# Patient Record
Sex: MALE | Race: WHITE | NOT HISPANIC OR LATINO | Employment: FULL TIME | URBAN - METROPOLITAN AREA
[De-identification: names, ages, dates, MRNs, and addresses within clinical notes are randomized per-mention and may not be internally consistent; named-entity substitution may affect disease eponyms.]

---

## 2019-03-20 ENCOUNTER — HOSPITAL ENCOUNTER (OUTPATIENT)
Dept: FAMILY MEDICINE CLINIC | Facility: CLINIC | Age: 54
Setting detail: SPECIMEN
Discharge: HOME | End: 2019-03-20
Attending: FAMILY MEDICINE | Admitting: FAMILY MEDICINE

## 2019-03-20 LAB
ALBUMIN SERPL-MCNC: 4.1 G/DL (ref 3.5–4.8)
ALBUMIN/GLOB SERPL: 1.3 {RATIO} (ref 1–1.7)
ALP SERPL-CCNC: 52 IU/L (ref 32–91)
ALT SERPL-CCNC: 25 IU/L (ref 17–63)
ANION GAP SERPL CALC-SCNC: 18.2 MMOL/L (ref 10–20)
AST SERPL-CCNC: 34 IU/L (ref 15–41)
BASOPHILS # BLD AUTO: 0 10*3/UL (ref 0–0.2)
BASOPHILS NFR BLD AUTO: 0 % (ref 0–2)
BILIRUB SERPL-MCNC: 0.9 MG/DL (ref 0.3–1.2)
BILIRUB UR QL STRIP: NEGATIVE MG/DL
BUN SERPL-MCNC: 15 MG/DL (ref 8–20)
BUN/CREAT SERPL: 12.5 (ref 6.2–20.3)
CALCIUM SERPL-MCNC: 9.2 MG/DL (ref 8.9–10.3)
CASTS URNS QL MICRO: NORMAL /[LPF]
CHLORIDE SERPL-SCNC: 99 MMOL/L (ref 101–111)
CHOLEST SERPL-MCNC: 237 MG/DL
CHOLEST/HDLC SERPL: 5 {RATIO}
COLOR UR: YELLOW
CONV BACTERIA IN URINE MICRO: NEGATIVE
CONV CLARITY OF URINE: CLEAR
CONV CO2: 24 MMOL/L (ref 22–32)
CONV HYALINE CASTS IN URINE MICRO: 0 /[LPF] (ref 0–5)
CONV LDL CHOLESTEROL DIRECT: 186 MG/DL (ref 0–100)
CONV PROTEIN IN URINE BY AUTOMATED TEST STRIP: NEGATIVE MG/DL
CONV SMALL ROUND CELLS: NORMAL /[HPF]
CONV TOTAL PROTEIN: 7.2 G/DL (ref 6.1–7.9)
CONV UROBILINOGEN IN URINE BY AUTOMATED TEST STRIP: 0.2 MG/DL
CREAT UR-MCNC: 1.2 MG/DL (ref 0.7–1.2)
CULTURE INDICATED?: NORMAL
DIFFERENTIAL METHOD BLD: (no result)
EOSINOPHIL # BLD AUTO: 0.2 10*3/UL (ref 0–0.3)
EOSINOPHIL # BLD AUTO: 4 % (ref 0–3)
ERYTHROCYTE [DISTWIDTH] IN BLOOD BY AUTOMATED COUNT: 13.3 % (ref 11.5–14.5)
GLOBULIN UR ELPH-MCNC: 3.1 G/DL (ref 2.5–3.8)
GLUCOSE SERPL-MCNC: 101 MG/DL (ref 65–99)
GLUCOSE UR QL: NEGATIVE MG/DL
HCT VFR BLD AUTO: 41.2 % (ref 40–54)
HDLC SERPL-MCNC: 47 MG/DL
HGB BLD-MCNC: 13.9 G/DL (ref 14–18)
HGB UR QL STRIP: NEGATIVE
KETONES UR QL STRIP: NEGATIVE MG/DL
LDLC/HDLC SERPL: 4 {RATIO}
LEUKOCYTE ESTERASE UR QL STRIP: NEGATIVE
LIPID INTERPRETATION: ABNORMAL
LYMPHOCYTES # BLD AUTO: 1.6 10*3/UL (ref 0.8–4.8)
LYMPHOCYTES NFR BLD AUTO: 39 % (ref 18–42)
MCH RBC QN AUTO: 32.1 PG (ref 26–32)
MCHC RBC AUTO-ENTMCNC: 33.7 G/DL (ref 32–36)
MCV RBC AUTO: 95.4 FL (ref 80–94)
MONOCYTES # BLD AUTO: 0.4 10*3/UL (ref 0.1–1.3)
MONOCYTES NFR BLD AUTO: 11 % (ref 2–11)
NEUTROPHILS # BLD AUTO: 1.8 10*3/UL (ref 2.3–8.6)
NEUTROPHILS NFR BLD AUTO: 46 % (ref 50–75)
NITRITE UR QL STRIP: NEGATIVE
NRBC BLD AUTO-RTO: 0 /100{WBCS}
NRBC/RBC NFR BLD MANUAL: 0 10*3/UL
PH UR STRIP.AUTO: 6 [PH] (ref 4.5–8)
PLATELET # BLD AUTO: 241 10*3/UL (ref 150–450)
PMV BLD AUTO: 8.1 FL (ref 7.4–10.4)
POTASSIUM SERPL-SCNC: 4.2 MMOL/L (ref 3.6–5.1)
RBC # BLD AUTO: 4.32 10*6/UL (ref 4.6–6)
RBC #/AREA URNS HPF: 0 /[HPF] (ref 0–3)
SODIUM SERPL-SCNC: 137 MMOL/L (ref 136–144)
SP GR UR: 1.02 (ref 1–1.03)
SPERM URNS QL MICRO: NORMAL /[HPF]
SQUAMOUS SPT QL MICRO: 0 /[HPF] (ref 0–5)
TRIGL SERPL-MCNC: 64 MG/DL
UNIDENT CRYS URNS QL MICRO: NORMAL /[HPF]
VLDLC SERPL CALC-MCNC: 3.8 MG/DL
WBC # BLD AUTO: 4 10*3/UL (ref 4.5–11.5)
WBC #/AREA URNS HPF: 0 /[HPF] (ref 0–5)
YEAST SPEC QL WET PREP: NORMAL /[HPF]

## 2019-06-17 RX ORDER — ACYCLOVIR 50 MG/G
OINTMENT TOPICAL
Qty: 15 G | Refills: 0 | Status: SHIPPED | OUTPATIENT
Start: 2019-06-17

## 2019-08-06 ENCOUNTER — OFFICE VISIT (OUTPATIENT)
Dept: FAMILY MEDICINE CLINIC | Facility: CLINIC | Age: 54
End: 2019-08-06

## 2019-08-06 VITALS
HEIGHT: 70 IN | DIASTOLIC BLOOD PRESSURE: 76 MMHG | HEART RATE: 47 BPM | TEMPERATURE: 97.8 F | BODY MASS INDEX: 29.72 KG/M2 | WEIGHT: 207.6 LBS | RESPIRATION RATE: 16 BRPM | OXYGEN SATURATION: 98 % | SYSTOLIC BLOOD PRESSURE: 114 MMHG

## 2019-08-06 DIAGNOSIS — B02.9 HERPES ZOSTER WITHOUT COMPLICATION: Primary | ICD-10-CM

## 2019-08-06 PROCEDURE — 99213 OFFICE O/P EST LOW 20 MIN: CPT | Performed by: NURSE PRACTITIONER

## 2019-08-06 RX ORDER — METHYLPREDNISOLONE 4 MG/1
TABLET ORAL
Qty: 21 TABLET | Refills: 0 | Status: SHIPPED | OUTPATIENT
Start: 2019-08-06 | End: 2020-01-29

## 2019-08-06 RX ORDER — VALACYCLOVIR HYDROCHLORIDE 1 G/1
1000 TABLET, FILM COATED ORAL 3 TIMES DAILY
Qty: 21 TABLET | Refills: 0 | Status: SHIPPED | OUTPATIENT
Start: 2019-08-06 | End: 2019-08-13

## 2019-08-06 RX ORDER — ACYCLOVIR 50 MG/G
CREAM TOPICAL
COMMUNITY
Start: 2019-06-04 | End: 2020-01-29 | Stop reason: SDUPTHER

## 2019-08-06 NOTE — PROGRESS NOTES
"Subjective   Brett Brush is a 53 y.o. male.     Chief Complaint   Patient presents with   • rash on head   • Herpes Zoster       /76 (BP Location: Left arm, Patient Position: Sitting, Cuff Size: Large Adult)   Pulse (!) 47   Temp 97.8 °F (36.6 °C) (Oral)   Resp 16   Ht 177.8 cm (70\")   Wt 94.2 kg (207 lb 9.6 oz)   SpO2 98%   BMI 29.79 kg/m²     BP Readings from Last 3 Encounters:   08/06/19 114/76   03/27/19 110/70   10/08/18 122/80       Wt Readings from Last 3 Encounters:   08/06/19 94.2 kg (207 lb 9.6 oz)   03/27/19 93 kg (204 lb 16 oz)   10/08/18 95.3 kg (209 lb 16 oz)       Pt comes in today with c/o poss shingles rash. States he noticed over the weekend had some head pain, tingling in head. Then developed an outbreak on back of scalp and has since developed new areas on right shoulder, neck, and cheek.  Has tingling, pain, and shooting pains in head.  Hx of chicken pox as a child.           The following portions of the patient's history were reviewed and updated as appropriate: allergies, current medications, past family history, past medical history, past social history, past surgical history and problem list.    Review of Systems   Skin: Positive for rash and skin lesions.       Objective   Physical Exam   Constitutional: He is oriented to person, place, and time. He appears well-developed and well-nourished.   Eyes: Pupils are equal, round, and reactive to light.   Cardiovascular: Normal rate and regular rhythm.   Pulmonary/Chest: Effort normal and breath sounds normal.   Neurological: He is alert and oriented to person, place, and time.   Skin:   Red, shingles rash on back of scalp on right side, neck, and right shoulder area. No itching.          Assessment/Plan   Brett was seen today for rash on head and herpes zoster.    Diagnoses and all orders for this visit:    Herpes zoster without complication  -     methylPREDNISolone (MEDROL, NANDA,) 4 MG tablet; Take as directed on package " instructions.  -     valACYclovir (VALTREX) 1000 MG tablet; Take 1 tablet by mouth 3 (Three) Times a Day for 7 days.    During this office visit, we discussed the pertinent aspects of the visit and treatment recommendations. Pt verbalizes understanding. Follow up was discussed. Patient was given the opportunity to ask questions and discuss other concerns.

## 2019-08-06 NOTE — PATIENT INSTRUCTIONS
Shingles    Shingles is an infection. It gives you a painful skin rash and blisters that have fluid in them. Shingles is caused by the same germ (virus) that causes chickenpox.  Shingles only happens in people who:  · Have had chickenpox.  · Have been given a shot of medicine (vaccine) to protect against chickenpox. Shingles is rare in this group.  The first symptoms of shingles may be itching, tingling, or pain in an area on your skin. A rash will show on your skin a few days or weeks later. The rash is likely to be on one side of your body. The rash usually has a shape like a belt or a band. Over time, the rash turns into fluid-filled blisters. The blisters will break open, change into scabs, and dry up. Medicines may:  · Help with pain and itching.  · Help you get better sooner.  · Help to prevent long-term problems.  Follow these instructions at home:  Medicines  · Take over-the-counter and prescription medicines only as told by your doctor.  · Put on an anti-itch cream or numbing cream where you have a rash, blisters, or scabs. Do this as told by your doctor.  Helping with itching and discomfort    · Put cold, wet cloths (cold compresses) on the area of the rash or blisters as told by your doctor.  · Cool baths can help you feel better. Try adding baking soda or dry oatmeal to the water to lessen itching. Do not bathe in hot water.  Blister and rash care  · Keep your rash covered with a loose bandage (dressing).  · Wear loose clothing that does not rub on your rash.  · Keep your rash and blisters clean. To do this, wash the area with mild soap and cool water as told by your doctor.  · Check your rash every day for signs of infection. Check for:  ? More redness, swelling, or pain.  ? Fluid or blood.  ? Warmth.  ? Pus or a bad smell.  · Do not scratch your rash. Do not pick at your blisters. To help you to not scratch:  ? Keep your fingernails clean and cut short.  ? Wear gloves or mittens when you sleep, if  scratching is a problem.  General instructions  · Rest as told by your doctor.  · Keep all follow-up visits as told by your doctor. This is important.  · Wash your hands often with soap and water. If soap and water are not available, use hand . Doing this lowers your chance of getting a skin infection caused by germs (bacteria).  · Your infection can cause chickenpox in people who have never had chickenpox or never got a shot of chickenpox vaccine. If you have blisters that did not change into scabs yet, try not to touch other people or be around other people, especially:  ? Babies.  ? Pregnant women.  ? Children who have areas of red, itchy, or rough skin (eczema).  ? Very old people who have transplants.  ? People who have a long-term (chronic) sickness, like cancer or AIDS.  Contact a doctor if:  · Your pain does not get better with medicine.  · Your pain does not get better after the rash heals.  · You have any signs of infection in the rash area. These signs include:  ? More redness, swelling, or pain around the rash.  ? Fluid or blood coming from the rash.  ? The rash area feeling warm to the touch.  ? Pus or a bad smell coming from the rash.  Get help right away if:  · The rash is on your face or nose.  · You have pain in your face or pain by your eye.  · You lose feeling on one side of your face.  · You have trouble seeing.  · You have ear pain, or you have ringing in your ear.  · You have a loss of taste.  · Your condition gets worse.  Summary  · Shingles gives you a painful skin rash and blisters that have fluid in them.  · Shingles is an infection. It is caused by the same germ (virus) that causes chickenpox.  · Keep your rash covered with a loose bandage (dressing). Wear loose clothing that does not rub on your rash.  · If you have blisters that did not change into scabs yet, try not to touch other people or be around people.  This information is not intended to replace advice given to you by  your health care provider. Make sure you discuss any questions you have with your health care provider.  Document Released: 06/05/2009 Document Revised: 08/22/2018 Document Reviewed: 08/22/2018  Elsevier Interactive Patient Education © 2019 [employer]

## 2020-01-27 ENCOUNTER — TELEPHONE (OUTPATIENT)
Dept: FAMILY MEDICINE CLINIC | Facility: CLINIC | Age: 55
End: 2020-01-27

## 2020-01-27 NOTE — TELEPHONE ENCOUNTER
VM MESSAGE.  Pt is requesting appointment ASAP for excruciating back pain. When do you want him scheduled? Thank you.

## 2020-01-29 ENCOUNTER — OFFICE VISIT (OUTPATIENT)
Dept: FAMILY MEDICINE CLINIC | Facility: CLINIC | Age: 55
End: 2020-01-29

## 2020-01-29 ENCOUNTER — LAB (OUTPATIENT)
Dept: FAMILY MEDICINE CLINIC | Facility: CLINIC | Age: 55
End: 2020-01-29

## 2020-01-29 VITALS
BODY MASS INDEX: 29.92 KG/M2 | HEIGHT: 70 IN | RESPIRATION RATE: 16 BRPM | DIASTOLIC BLOOD PRESSURE: 80 MMHG | OXYGEN SATURATION: 98 % | WEIGHT: 209 LBS | HEART RATE: 55 BPM | TEMPERATURE: 98.3 F | SYSTOLIC BLOOD PRESSURE: 118 MMHG

## 2020-01-29 DIAGNOSIS — M54.50 ACUTE BILATERAL LOW BACK PAIN WITHOUT SCIATICA: Primary | ICD-10-CM

## 2020-01-29 DIAGNOSIS — E78.2 MIXED HYPERLIPIDEMIA: ICD-10-CM

## 2020-01-29 LAB
CHOLEST SERPL-MCNC: 215 MG/DL (ref 0–200)
HDLC SERPL-MCNC: 47 MG/DL (ref 40–60)
LDLC SERPL CALC-MCNC: 145 MG/DL (ref 0–100)
LDLC/HDLC SERPL: 3.08 {RATIO}
TRIGL SERPL-MCNC: 117 MG/DL (ref 0–150)
VLDLC SERPL-MCNC: 23.4 MG/DL (ref 5–40)

## 2020-01-29 PROCEDURE — 99213 OFFICE O/P EST LOW 20 MIN: CPT | Performed by: FAMILY MEDICINE

## 2020-01-29 PROCEDURE — 80061 LIPID PANEL: CPT | Performed by: FAMILY MEDICINE

## 2020-01-29 NOTE — ASSESSMENT & PLAN NOTE
Patient has and injured his lower back.  He has basically muscle spasms in his extensor muscles probably the latissimus dorsi and the deeper muscles of his back.  They mainly go into spasm when he tries to extend his back after sitting for a while.  There is no radicular pattern into his butt or legs.  No sciatica the pain actually wraps around to the front more bilaterally like his core muscles.  That would make sense since he is not really done anything to injure this except overuse his muscles with yoga and some other activities that he does.  Right now he is going to just be very careful apply heat stretch and protect the muscles for a few weeks.  He is going to stay hydrated and work on a see how he is feeling in 2 to 3 weeks.  Physical therapy will be next if he is not better in 2 to 3 weeks.

## 2020-01-29 NOTE — PROGRESS NOTES
Rooming Tab(CC,VS,Pt Hx,Fall Screen)  Chief Complaint   Patient presents with   • Back Pain       Subjective    Patient with severe bilateral back pain.  He has been working out and doing yoga and probably injured it then.  When he stands to extend his back it graps him with spasms.  The spasms of pain are so severe that it will drop him to his knees if he is not careful.  Once he is over the spasm back does not hurt until another episode.  They are usually triggered by coming from a flexed position to an extended lumbar position.    I have reviewed and updated his medications, medical history and problem list during today's office visit.     Patient Care Team:  Romeo Gutierrez MD as PCP - General    Problem List Tab  Medications Tab  Synopsis Tab  Chart Review Tab  Care Everywhere Tab  Immunizations Tab  Patient History Tab    Social History     Tobacco Use   • Smoking status: Never Smoker   • Smokeless tobacco: Never Used   Substance Use Topics   • Alcohol use: Yes       Review of Systems   Constitutional: Negative for activity change, appetite change, fatigue, fever and unexpected weight loss.   HENT: Negative for congestion, ear pain, hearing loss, postnasal drip, rhinorrhea, sinus pressure, sneezing, sore throat and swollen glands.    Eyes: Negative for blurred vision.   Respiratory: Negative for cough, shortness of breath and wheezing.    Cardiovascular: Negative for chest pain.   Gastrointestinal: Negative for abdominal pain, nausea and indigestion.   Genitourinary: Negative for dysuria, urgency and urinary incontinence.   Musculoskeletal: Positive for back pain and myalgias. Negative for arthralgias and joint swelling.   Skin: Negative for dry skin and skin lesions.   Allergic/Immunologic: Negative for environmental allergies.   Neurological: Negative for dizziness, headache, memory problem and confusion.   Hematological: Negative for adenopathy.   Psychiatric/Behavioral: Negative for agitation,  "depressed mood and stress. The patient is not nervous/anxious.    All other systems reviewed and are negative.      Objective     Rooming Tab(CC,VS,Pt Hx,Fall Screen)  /80 (BP Location: Left arm, Cuff Size: Large Adult)   Pulse 55   Temp 98.3 °F (36.8 °C) (Oral)   Resp 16   Ht 177.8 cm (70\")   Wt 94.8 kg (209 lb)   SpO2 98%   BMI 29.99 kg/m²     Body mass index is 29.99 kg/m².    Physical Exam   Constitutional: He is oriented to person, place, and time. He appears well-developed and well-nourished.   HENT:   Head: Normocephalic and atraumatic.   Right Ear: External ear normal.   Left Ear: External ear normal.   Nose: Nose normal.   Mouth/Throat: Oropharynx is clear and moist. No oropharyngeal exudate.   Eyes: Pupils are equal, round, and reactive to light. Conjunctivae and EOM are normal. Right eye exhibits no discharge. Left eye exhibits no discharge. No scleral icterus.   Neck: Normal range of motion. Neck supple. No JVD present. No thyromegaly present.   Cardiovascular: Normal rate, regular rhythm, normal heart sounds and intact distal pulses.   No murmur heard.  Pulmonary/Chest: Effort normal and breath sounds normal. No respiratory distress. He has no wheezes. He has no rales. He exhibits no tenderness.   Abdominal: Soft. Bowel sounds are normal. He exhibits no distension. There is no tenderness.   Genitourinary: Rectal exam shows guaiac negative stool.   Musculoskeletal: Normal range of motion. He exhibits no edema, tenderness or deformity.   Patient has full range of motion of all of his joints including his lumbar spine.  He is just very cautious as he comes from a toe-touch up to a standing position.  That is the type of motion that has triggered this back spasm that he is been having.  The spasm is intense when it happens and almost drops into his knees.  It does not radiate into the buttocks or down his leg.  It is located in the right or left low lumbar area and can be on the right side or " the left side when it occurs.  If it happens and he bends just a little bit forward it will usually come down.  No change in bowel or about bladder habit   Lymphadenopathy:     He has no cervical adenopathy.   Neurological: He is alert and oriented to person, place, and time.   Skin: Skin is warm and dry.   Psychiatric: He has a normal mood and affect. His behavior is normal. Judgment and thought content normal.   Vitals reviewed.       Statin Choice Calculator  Data Reviewed:                   Assessment/Plan   Order Review Tab  Health Maintenance Tab  Patient Plan/Order Tab  Diagnoses and all orders for this visit:    1. Acute bilateral low back pain without sciatica (Primary)  Assessment & Plan:  Patient has and injured his lower back.  He has basically muscle spasms in his extensor muscles probably the latissimus dorsi and the deeper muscles of his back.  They mainly go into spasm when he tries to extend his back after sitting for a while.  There is no radicular pattern into his butt or legs.  No sciatica the pain actually wraps around to the front more bilaterally like his core muscles.  That would make sense since he is not really done anything to injure this except overuse his muscles with yoga and some other activities that he does.  Right now he is going to just be very careful apply heat stretch and protect the muscles for a few weeks.  He is going to stay hydrated and work on a see how he is feeling in 2 to 3 weeks.  Physical therapy will be next if he is not better in 2 to 3 weeks.      2. Mixed hyperlipidemia  Assessment & Plan:  Lipid abnormalities are unchanged.  Nutritional counseling was provided.  Lipids will be reassessed in 3 months.    Patient's cholesterol total and LDL are quite elevated in March of this year.  He has been following a low-carb/low-fat diet since then and has been very active exercising.  We will repeat his lipids today and if they are still elevated we may need to consider  adding in a statin.  He eats reasonably clean and does not have any reason to have elevated cholesterol other than genetics.  We will see what today brings and talk again about maybe adding a statin and or maybe waiting another year to give him a longer chance to turn this with lifestyle.    Orders:  -     Lipid Panel; Future      Wrapup Tab  Return in about 4 weeks (around 2/26/2020), or if symptoms worsen or fail to improve, for Annual physical.

## 2020-01-29 NOTE — ASSESSMENT & PLAN NOTE
Lipid abnormalities are unchanged.  Nutritional counseling was provided.  Lipids will be reassessed in 3 months.    Patient's cholesterol total and LDL are quite elevated in March of this year.  He has been following a low-carb/low-fat diet since then and has been very active exercising.  We will repeat his lipids today and if they are still elevated we may need to consider adding in a statin.  He eats reasonably clean and does not have any reason to have elevated cholesterol other than genetics.  We will see what today brings and talk again about maybe adding a statin and or maybe waiting another year to give him a longer chance to turn this with lifestyle.

## 2020-01-31 DIAGNOSIS — E78.2 MIXED HYPERLIPIDEMIA: Primary | ICD-10-CM

## 2020-03-02 DIAGNOSIS — M54.40 BACK PAIN OF LUMBAR REGION WITH SCIATICA: Primary | ICD-10-CM

## 2020-03-03 ENCOUNTER — TELEPHONE (OUTPATIENT)
Dept: FAMILY MEDICINE CLINIC | Facility: CLINIC | Age: 55
End: 2020-03-03

## 2020-03-03 NOTE — TELEPHONE ENCOUNTER
PT CALLED AND IS ASKING FOR MRI REFERRAL TO BE FAXED TO     [facility]   [phone]           C/B

## 2020-03-04 ENCOUNTER — TELEPHONE (OUTPATIENT)
Dept: FAMILY MEDICINE CLINIC | Facility: CLINIC | Age: 55
End: 2020-03-04

## 2020-03-04 NOTE — TELEPHONE ENCOUNTER
Dr. Gutierrez, the Request for the MRI has gone in to PEER to PEER review even with submitting clinical information. The number is [phone].  And the tracking number is [id number].      Thanks, Albina

## 2020-03-05 ENCOUNTER — TELEPHONE (OUTPATIENT)
Dept: FAMILY MEDICINE CLINIC | Facility: CLINIC | Age: 55
End: 2020-03-05

## 2020-03-05 NOTE — TELEPHONE ENCOUNTER
Patients MRI has been denied and I have let Dr. Gutierrez know and am waiting to see where he would like to go from here.  But patients insurance did deny the MRI.       Thanks, Albina

## 2020-03-05 NOTE — TELEPHONE ENCOUNTER
Patient is checking the status of an MRI that he has scheduled 03/12.  Please advise    Patient call back [phone]   [facility] - + ETOH

## 2020-03-06 NOTE — TELEPHONE ENCOUNTER
Patient is calling back to check the status of MRI Authorization.  Patient states he needs to know as soon as possible because he needs to let work know.      Patient call back [phone]

## 2020-03-06 NOTE — TELEPHONE ENCOUNTER
Anita or Sary.  Please call Bossman and tell him that we need all of the chiropractic notes from his chiropractor for the last year or 2.  Once we have those they need to be faxed to [facility] at 1 [phone] and then put his tracking number down which is [id number].  They will review those records and make a decision about the MRI or not.

## 2020-03-10 ENCOUNTER — TELEPHONE (OUTPATIENT)
Dept: FAMILY MEDICINE CLINIC | Facility: CLINIC | Age: 55
End: 2020-03-10

## 2020-03-10 NOTE — TELEPHONE ENCOUNTER
This was signed but in the pool. There is other  Notes re: this PA on MRI     This MRI has been denied   Albina has notified     Patient is waiting on next step re: what the Doctor advises to do     HE HAS APPT ON MARCH 12th to discuss this with [facility]

## 2020-03-10 NOTE — TELEPHONE ENCOUNTER
Pt is calling to check on the status of his PA for an MRI he stated he has an appointment for this Thursday 03/12/2020.     Pt requests a call back as soon as possible.     Pt has not heard any updates or response to other messages left. Pt would like someone to touch base with him with some information regarding this MRI.      Pt can be contacted at [phone]

## 2020-03-11 ENCOUNTER — TELEPHONE (OUTPATIENT)
Dept: FAMILY MEDICINE CLINIC | Facility: CLINIC | Age: 55
End: 2020-03-11

## 2020-03-11 NOTE — TELEPHONE ENCOUNTER
Patient called in and stated his insurance company [employer] denied the approval for the MRI scheduled tomorrow. Patient was told by the insurance company to call his pcp regarding the code for denial.    Please call patient to advise  Patient call back [phone]

## 2020-03-11 NOTE — TELEPHONE ENCOUNTER
Spoke with patient and patient is going to have his Chiropractor fax over notes from office visits and I am going to submit those to his insurance company.

## 2020-03-12 ENCOUNTER — APPOINTMENT (OUTPATIENT)
Dept: MRI IMAGING | Facility: HOSPITAL | Age: 55
End: 2020-03-12

## 2020-03-12 DIAGNOSIS — M54.40 BACK PAIN OF LUMBAR REGION WITH SCIATICA: ICD-10-CM

## 2020-03-15 NOTE — PROGRESS NOTES
I spoke with Brett.  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  He is about 60% better from the beginning of this ordeal with chiropractic and Toradol IM.  We will continue the Toradol IM and give him another 2 to 3 weeks for improvement.  If no improvement we will need to be sending him to neurosurgery.

## 2020-08-28 ENCOUNTER — LAB (OUTPATIENT)
Dept: FAMILY MEDICINE CLINIC | Facility: CLINIC | Age: 55
End: 2020-08-28

## 2020-08-28 ENCOUNTER — OFFICE VISIT (OUTPATIENT)
Dept: FAMILY MEDICINE CLINIC | Facility: CLINIC | Age: 55
End: 2020-08-28

## 2020-08-28 ENCOUNTER — TELEPHONE (OUTPATIENT)
Dept: FAMILY MEDICINE CLINIC | Facility: CLINIC | Age: 55
End: 2020-08-28

## 2020-08-28 VITALS
SYSTOLIC BLOOD PRESSURE: 114 MMHG | WEIGHT: 204.4 LBS | OXYGEN SATURATION: 97 % | RESPIRATION RATE: 16 BRPM | BODY MASS INDEX: 29.33 KG/M2 | TEMPERATURE: 97.3 F | HEART RATE: 50 BPM | DIASTOLIC BLOOD PRESSURE: 64 MMHG

## 2020-08-28 DIAGNOSIS — L74.0 MILIARIA RUBRA: Primary | ICD-10-CM

## 2020-08-28 DIAGNOSIS — E78.2 MIXED HYPERLIPIDEMIA: ICD-10-CM

## 2020-08-28 PROBLEM — L30.1 DYSHIDROTIC ECZEMA: Status: ACTIVE | Noted: 2020-08-28

## 2020-08-28 LAB
CHOLEST SERPL-MCNC: 222 MG/DL (ref 0–200)
HDLC SERPL-MCNC: 48 MG/DL (ref 40–60)
LDLC SERPL CALC-MCNC: 146 MG/DL (ref 0–100)
LDLC/HDLC SERPL: 3.04 {RATIO}
TRIGL SERPL-MCNC: 141 MG/DL (ref 0–150)
VLDLC SERPL-MCNC: 28.2 MG/DL (ref 5–40)

## 2020-08-28 PROCEDURE — 80061 LIPID PANEL: CPT | Performed by: FAMILY MEDICINE

## 2020-08-28 PROCEDURE — 99213 OFFICE O/P EST LOW 20 MIN: CPT | Performed by: PHYSICIAN ASSISTANT

## 2020-08-28 NOTE — TELEPHONE ENCOUNTER
PATIENT SAW GERARD SILVERIO TODAY AND HE WENT TO  THE OINTMENT HE ORDERED BUT THE PHARMACY NEEDS INFORMATION FROM THE DR Barron TO PROCESS IT. IT IS FOR A RASH AND HE WOULD LIKE TO GET IT TODAY.      PLEASE ADVISE  [phone]     [facility]/pharmacy #[id number] - [place], [place] - [address] - [phone]  - [phone] FX

## 2020-08-28 NOTE — PROGRESS NOTES
Answers for HPI/ROS submitted by the patient on 8/28/2020   Rash  What is the primary reason for your visit?: Rash  Chronicity: chronic  Onset: more than 1 month ago  Progression since onset: waxing and waning  Affected locations: face, torso, left wrist, left hip  Characteristics: blistering, itchiness  Exposed to: unknown  anorexia: No  Subjective   Brett Brush is a 54 y.o. male.     Chief Complaint   Patient presents with   • Rash     wrist       /64 (BP Location: Left arm, Patient Position: Sitting, Cuff Size: Adult)   Pulse 50   Temp 97.3 °F (36.3 °C) (Temporal)   Resp 16   Wt 92.7 kg (204 lb 6.4 oz)   SpO2 97%   BMI 29.33 kg/m²     BP Readings from Last 3 Encounters:   08/28/20 114/64   01/29/20 118/80   08/06/19 114/76       Wt Readings from Last 3 Encounters:   08/28/20 92.7 kg (204 lb 6.4 oz)   01/29/20 94.8 kg (209 lb)   08/06/19 94.2 kg (207 lb 9.6 oz)       HPI patient presents to the clinic with complaints of an intermittent vesicular rash that occurs on multiple areas of his body at times and then goes away on its own.  He notices it typically on the flexural surfaces of his body including his wrist.  He has noticed that underneath his left breast.  He does do CrossFit on a regular basis.  He does sweat significantly and does hot yoga as well.  The rash does not hurt and only occasionally itches.  He uses over-the-counter creams typically will go away but then returned.    The following portions of the patient's history were reviewed and updated as appropriate: allergies, current medications, past family history, past medical history, past social history, past surgical history and problem list.    Review of Systems   Constitutional: Positive for fatigue. Negative for activity change, appetite change and fever.   HENT: Negative for congestion, rhinorrhea and sore throat.    Eyes: Negative for blurred vision, pain and visual disturbance.   Respiratory: Negative for cough, chest tightness  and shortness of breath.    Cardiovascular: Negative for chest pain and leg swelling.   Gastrointestinal: Positive for diarrhea. Negative for abdominal pain, blood in stool, nausea and vomiting.   Endocrine: Negative for polydipsia and polyuria.   Genitourinary: Negative for dysuria and urgency.   Musculoskeletal: Negative for arthralgias and back pain.   Skin: Positive for rash. Negative for bruise.   Allergic/Immunologic: Negative for environmental allergies.   Neurological: Negative for headache and confusion.   Hematological: Negative for adenopathy. Does not bruise/bleed easily.   Psychiatric/Behavioral: Negative for stress. The patient is not nervous/anxious.        Objective   Physical Exam   Constitutional: He is oriented to person, place, and time. He appears well-developed and well-nourished.   HENT:   Head: Normocephalic and atraumatic.   Eyes: Pupils are equal, round, and reactive to light. Conjunctivae and EOM are normal.   Neck: Normal range of motion. Neck supple.   Cardiovascular: Normal rate and regular rhythm.   No murmur heard.  Pulmonary/Chest: Effort normal and breath sounds normal.   Abdominal: Soft. Bowel sounds are normal. There is no tenderness.   Musculoskeletal: Normal range of motion. He exhibits no deformity.   Lymphadenopathy:     He has no cervical adenopathy.   Neurological: He is alert and oriented to person, place, and time. No cranial nerve deficit.   Skin: Skin is warm and dry. Capillary refill takes less than 2 seconds. Rash noted.   Vesicular clustered rash in the flexural surface of the wrist   Psychiatric: He has a normal mood and affect. His behavior is normal. Judgment and thought content normal.         Diagnoses and all orders for this visit:    1. Miliaria rubra (Primary)    Other orders  -     permethrin 1 % lotion; Apply  topically to the appropriate area as directed 1 (One) Time for 1 dose.  Dispense: 120 mL; Refill: 1  -     triamcinolone (KENALOG) 0.1 % ointment;  Apply  topically to the appropriate area as directed 2 (Two) Times a Day.  Dispense: 30 g; Refill: 1    I believe this a rash is consistent with miliaria rubra and is likely secondary to sweat.  There is not a significant amount of this rash at this time as it has resolved and so I will treat with permethrin just to ensure that this is not scabies.  We can try triamcinolone cream for outbreaks.    Return if symptoms worsen or fail to improve.  congestion: No  cough: No  diarrhea: Yes  eye pain: No  facial edema: No  fatigue: Yes  fever: No  joint pain: No  nail changes: No  rhinorrhea: No  shortness of breath: No  sore throat: No  vomiting: No

## 2023-02-23 DIAGNOSIS — G47.33 OSA (OBSTRUCTIVE SLEEP APNEA): Primary | ICD-10-CM

## 2023-05-18 ENCOUNTER — TELEPHONE (OUTPATIENT)
Dept: FAMILY MEDICINE CLINIC | Facility: CLINIC | Age: 58
End: 2023-05-18
Payer: COMMERCIAL

## 2023-05-18 DIAGNOSIS — G47.33 OSA (OBSTRUCTIVE SLEEP APNEA): ICD-10-CM

## 2023-05-18 DIAGNOSIS — E78.2 MIXED HYPERLIPIDEMIA: Primary | ICD-10-CM

## 2023-05-18 DIAGNOSIS — M54.50 ACUTE BILATERAL LOW BACK PAIN WITHOUT SCIATICA: ICD-10-CM

## 2023-05-18 DIAGNOSIS — L30.1 DYSHIDROTIC ECZEMA: ICD-10-CM

## 2023-05-18 DIAGNOSIS — Z12.5 SPECIAL SCREENING FOR MALIGNANT NEOPLASM OF PROSTATE: ICD-10-CM

## 2023-05-18 NOTE — TELEPHONE ENCOUNTER
Caller: Brett Brush    Relationship: Self    Best call back number: [phone]    What orders are you requesting (i.e. lab or imaging): YEARLY LABS    In what timeframe would the patient need to come in: PRIOR TO PHYSICAL ON 11/15/2023    Where will you receive your lab/imaging services: IN OFFICE      PATIENT STATES HE IS REQUESTING A CALL BACK WHEN THE ORDERS ARE ENTERED SO HE CAN GET SCHEDULED.

## 2023-07-20 PROBLEM — E78.5 HYPERLIPIDEMIA: Status: ACTIVE | Noted: 2019-03-27

## 2023-07-20 PROBLEM — M79.2 NEUROPATHIC PAIN: Status: ACTIVE | Noted: 2018-10-08

## 2023-07-20 PROBLEM — S46.212A STRAIN OF MUSCLE, FASCIA AND TENDON OF OTHER PARTS OF BICEPS, LEFT ARM, INITIAL ENCOUNTER: Status: ACTIVE | Noted: 2017-07-13

## 2023-07-24 ENCOUNTER — PATIENT ROUNDING (BHMG ONLY) (OUTPATIENT)
Dept: FAMILY MEDICINE CLINIC | Facility: CLINIC | Age: 58
End: 2023-07-24
Payer: COMMERCIAL

## 2023-07-24 NOTE — PROGRESS NOTES
July 24, 2023      A [employer] message was sent to Brett Brush inquiring about patient's experience at our office during a recent visit.      ERIC [place]  [facility]  [address]   [place] [id number].

## 2023-08-09 DIAGNOSIS — N52.9 ERECTILE DYSFUNCTION, UNSPECIFIED ERECTILE DYSFUNCTION TYPE: ICD-10-CM

## 2023-08-09 DIAGNOSIS — B00.1 RECURRENT COLD SORES: ICD-10-CM

## 2023-08-09 RX ORDER — ACYCLOVIR 50 MG/G
OINTMENT TOPICAL EVERY 4 HOURS
Qty: 15 G | Refills: 1 | Status: SHIPPED | OUTPATIENT
Start: 2023-08-09

## 2023-08-09 RX ORDER — SILDENAFIL 50 MG/1
50 TABLET, FILM COATED ORAL DAILY PRN
Qty: 30 TABLET | Refills: 1 | Status: SHIPPED | OUTPATIENT
Start: 2023-08-09

## 2023-08-09 NOTE — TELEPHONE ENCOUNTER
Caller: Brett Brush    Relationship: Self    Best call back number: [phone]    Requested Prescriptions:   Requested Prescriptions     Pending Prescriptions Disp Refills    sildenafil (VIAGRA) 50 MG tablet 30 tablet 1     Sig: Take 1 tablet by mouth Daily As Needed for Erectile Dysfunction.    acyclovir (ZOVIRAX) 5 % ointment 15 g 1     Sig: Apply  topically to the appropriate area as directed Every 4 (Four) Hours.        Pharmacy where request should be sent: [facility] [id number] [place] [id number] [facility] - [phone] [facility] [phone] FX     Last office visit with prescribing clinician: Visit date not found   Last telemedicine visit with prescribing clinician: Visit date not found   Next office visit with prescribing clinician: Visit date not found     Additional details provided by patient: WILL NEED TO FILLED, WILL NEED GOOD RX CARD    Does the patient have less than a 3 day supply:  [x] Yes  [] No    Would you like a call back once the refill request has been completed: [] Yes [x] No    If the office needs to give you a call back, can they leave a voicemail: [] Yes [x] No    Katy Lepe   08/09/23 11:59 EDT

## 2024-08-15 ENCOUNTER — TELEPHONE (OUTPATIENT)
Dept: INTERNAL MEDICINE | Facility: HOSPITAL | Age: 59
End: 2024-08-15

## 2024-09-26 ENCOUNTER — HOSPITAL ENCOUNTER (OUTPATIENT)
Dept: CT IMAGING | Facility: HOSPITAL | Age: 59
Discharge: HOME | End: 2024-09-26

## 2024-09-26 DIAGNOSIS — Z00.00 ANNUAL PHYSICAL EXAM: ICD-10-CM

## 2024-09-26 PROCEDURE — 75571 CT HRT W/O DYE W/CA TEST: CPT
